# Patient Record
Sex: MALE | Race: WHITE | NOT HISPANIC OR LATINO | Employment: FULL TIME | URBAN - METROPOLITAN AREA
[De-identification: names, ages, dates, MRNs, and addresses within clinical notes are randomized per-mention and may not be internally consistent; named-entity substitution may affect disease eponyms.]

---

## 2023-07-27 ENCOUNTER — HOSPITAL ENCOUNTER (OUTPATIENT)
Dept: DATA CONVERSION | Facility: HOSPITAL | Age: 38
Discharge: HOME | End: 2023-07-27

## 2023-07-27 DIAGNOSIS — Z87.442 PERSONAL HISTORY OF URINARY CALCULI: ICD-10-CM

## 2023-07-27 DIAGNOSIS — Z88.0 ALLERGY STATUS TO PENICILLIN: ICD-10-CM

## 2023-07-27 DIAGNOSIS — R10.9 UNSPECIFIED ABDOMINAL PAIN: Primary | ICD-10-CM

## 2023-07-27 DIAGNOSIS — R11.0 NAUSEA: ICD-10-CM

## 2023-07-27 LAB
ALBUMIN SERPL-MCNC: 4.6 GM/DL (ref 3.5–5)
ALBUMIN/GLOB SERPL: 1.4 RATIO (ref 1.5–3)
ALP BLD-CCNC: 102 U/L (ref 35–125)
ALT SERPL-CCNC: 30 U/L (ref 5–40)
ANION GAP SERPL CALCULATED.3IONS-SCNC: 9 MMOL/L (ref 0–19)
AST SERPL-CCNC: 22 U/L (ref 5–40)
BACTERIA UR QL AUTO: NEGATIVE
BASOPHILS # BLD AUTO: 0.05 K/UL (ref 0–0.22)
BASOPHILS NFR BLD AUTO: 0.5 % (ref 0–1)
BILIRUB SERPL-MCNC: 0.4 MG/DL (ref 0.1–1.2)
BILIRUB UR QL STRIP.AUTO: NEGATIVE
BUN SERPL-MCNC: 14 MG/DL (ref 8–25)
BUN/CREAT SERPL: 14 RATIO (ref 8–21)
CALCIUM SERPL-MCNC: 9.9 MG/DL (ref 8.5–10.4)
CHLORIDE SERPL-SCNC: 102 MMOL/L (ref 97–107)
CLARITY UR: CLEAR
CO2 SERPL-SCNC: 30 MMOL/L (ref 24–31)
COLOR UR: ABNORMAL
CREAT SERPL-MCNC: 1 MG/DL (ref 0.4–1.6)
DEPRECATED RDW RBC AUTO: 42.6 FL (ref 37–54)
DIFFERENTIAL METHOD BLD: ABNORMAL
EOSINOPHIL # BLD AUTO: 0.11 K/UL (ref 0–0.45)
EOSINOPHIL NFR BLD: 1 % (ref 0–3)
ERYTHROCYTE [DISTWIDTH] IN BLOOD BY AUTOMATED COUNT: 12.2 % (ref 11.7–15)
GFR SERPL CREATININE-BSD FRML MDRD: 101 ML/MIN/1.73 M2
GLOBULIN SER-MCNC: 3.3 G/DL (ref 1.9–3.7)
GLUCOSE SERPL-MCNC: 101 MG/DL (ref 65–99)
GLUCOSE UR STRIP.AUTO-MCNC: NEGATIVE MG/DL
HCT VFR BLD AUTO: 50 % (ref 41–50)
HGB BLD-MCNC: 17.3 GM/DL (ref 13.5–16.5)
HGB UR QL STRIP.AUTO: 4 /HPF (ref 0–3)
HGB UR QL: NEGATIVE
HYALINE CASTS UR QL AUTO: ABNORMAL /LPF
IMM GRANULOCYTES # BLD AUTO: 0.04 K/UL (ref 0–0.1)
KETONES UR QL STRIP.AUTO: NEGATIVE
LEUKOCYTE ESTERASE UR QL STRIP.AUTO: NEGATIVE
LYMPHOCYTES # BLD AUTO: 2.46 K/UL (ref 1.2–3.2)
LYMPHOCYTES NFR BLD MANUAL: 23 % (ref 20–40)
MCH RBC QN AUTO: 32.7 PG (ref 26–34)
MCHC RBC AUTO-ENTMCNC: 34.6 % (ref 31–37)
MCV RBC AUTO: 94.5 FL (ref 80–100)
MICROSCOPIC (UA): ABNORMAL
MONOCYTES # BLD AUTO: 0.92 K/UL (ref 0–0.8)
MONOCYTES NFR BLD MANUAL: 8.6 % (ref 0–8)
NEUTROPHILS # BLD AUTO: 7.11 K/UL
NEUTROPHILS # BLD AUTO: 7.11 K/UL (ref 1.8–7.7)
NEUTROPHILS.IMMATURE NFR BLD: 0.4 % (ref 0–1)
NEUTS SEG NFR BLD: 66.5 % (ref 50–70)
NITRITE UR QL STRIP.AUTO: NEGATIVE
NRBC BLD-RTO: 0 /100 WBC
PH UR STRIP.AUTO: 7 [PH] (ref 4.6–8)
PLATELET # BLD AUTO: 426 K/UL (ref 150–450)
PMV BLD AUTO: 9.6 CU (ref 7–12.6)
POTASSIUM SERPL-SCNC: 4.5 MMOL/L (ref 3.4–5.1)
PROT SERPL-MCNC: 7.9 G/DL (ref 5.9–7.9)
PROT UR STRIP.AUTO-MCNC: ABNORMAL MG/DL
RBC # BLD AUTO: 5.29 M/UL (ref 4.5–5.5)
SODIUM SERPL-SCNC: 141 MMOL/L (ref 133–145)
SP GR UR STRIP.AUTO: 1.02 (ref 1–1.03)
SQUAMOUS UR QL AUTO: ABNORMAL /HPF
URINE CULTURE: ABNORMAL
UROBILINOGEN UR QL STRIP.AUTO: NORMAL MG/DL (ref 0–1)
WBC # BLD AUTO: 10.7 K/UL (ref 4.5–11)
WBC #/AREA URNS AUTO: ABNORMAL /HPF (ref 0–3)

## 2023-09-12 ENCOUNTER — HOSPITAL ENCOUNTER (OUTPATIENT)
Dept: DATA CONVERSION | Facility: HOSPITAL | Age: 38
End: 2023-09-12

## 2023-09-29 ENCOUNTER — HOSPITAL ENCOUNTER (OUTPATIENT)
Dept: DATA CONVERSION | Facility: HOSPITAL | Age: 38
Discharge: HOME | End: 2023-09-29
Payer: COMMERCIAL

## 2023-09-29 DIAGNOSIS — M54.12 RADICULOPATHY, CERVICAL REGION: ICD-10-CM

## 2023-10-04 PROBLEM — N23 RENAL COLIC: Status: ACTIVE | Noted: 2023-10-04

## 2023-10-04 PROBLEM — D72.829 LEUKOCYTOSIS: Status: ACTIVE | Noted: 2023-10-04

## 2023-10-04 PROBLEM — E66.9 OBESITY, UNSPECIFIED: Status: ACTIVE | Noted: 2023-10-04

## 2023-10-04 PROBLEM — M51.9 DISORDER OF INTERVERTEBRAL DISC OF LUMBAR SPINE: Status: ACTIVE | Noted: 2023-10-04
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
## 2023-10-04 PROBLEM — S06.0X0A CONCUSSION WITHOUT LOSS OF CONSCIOUSNESS: Status: ACTIVE | Noted: 2023-10-04

## 2023-10-04 PROBLEM — M21.70 LEG LENGTH DISCREPANCY: Status: ACTIVE | Noted: 2023-10-04

## 2023-10-04 PROBLEM — F41.9 ANXIETY: Status: ACTIVE | Noted: 2023-10-04

## 2023-10-04 PROBLEM — D72.9 ABNORMAL WBC COUNT: Status: ACTIVE | Noted: 2023-10-04

## 2023-10-04 PROBLEM — I10 PRIMARY HYPERTENSION: Status: ACTIVE | Noted: 2023-10-04

## 2023-10-04 RX ORDER — METHOCARBAMOL 750 MG/1
TABLET, FILM COATED ORAL
COMMUNITY
End: 2024-03-15 | Stop reason: WASHOUT

## 2023-10-04 RX ORDER — ONDANSETRON 4 MG/1
4 TABLET, ORALLY DISINTEGRATING ORAL EVERY 6 HOURS PRN
COMMUNITY
Start: 2023-07-23 | End: 2024-03-15 | Stop reason: WASHOUT

## 2023-10-04 RX ORDER — ATORVASTATIN CALCIUM 10 MG/1
10 TABLET, FILM COATED ORAL DAILY
COMMUNITY
Start: 2023-07-11 | End: 2024-03-15 | Stop reason: WASHOUT

## 2023-10-04 RX ORDER — OXYCODONE AND ACETAMINOPHEN 5; 325 MG/1; MG/1
TABLET ORAL
COMMUNITY
Start: 2023-07-23 | End: 2024-03-15 | Stop reason: WASHOUT

## 2023-10-04 RX ORDER — PREDNISONE 10 MG/1
TABLET ORAL
COMMUNITY
Start: 2023-09-11 | End: 2024-03-15 | Stop reason: WASHOUT

## 2023-10-04 RX ORDER — LOSARTAN POTASSIUM 25 MG/1
TABLET ORAL
COMMUNITY
End: 2024-03-15 | Stop reason: WASHOUT

## 2023-10-04 RX ORDER — MELOXICAM 15 MG/1
TABLET ORAL
COMMUNITY
End: 2024-03-15 | Stop reason: WASHOUT

## 2023-10-04 RX ORDER — HYDROCODONE BITARTRATE AND ACETAMINOPHEN 5; 325 MG/1; MG/1
1 TABLET ORAL EVERY 4 HOURS PRN
COMMUNITY
Start: 2023-06-19 | End: 2024-03-15 | Stop reason: WASHOUT

## 2023-10-05 ENCOUNTER — APPOINTMENT (OUTPATIENT)
Dept: PHYSICAL THERAPY | Facility: CLINIC | Age: 38
End: 2023-10-05
Payer: COMMERCIAL

## 2023-10-05 ENCOUNTER — TREATMENT (OUTPATIENT)
Dept: PHYSICAL THERAPY | Facility: CLINIC | Age: 38
End: 2023-10-05
Payer: COMMERCIAL

## 2023-10-05 DIAGNOSIS — M54.12 CERVICAL RADICULOPATHY: Primary | ICD-10-CM

## 2023-10-05 DIAGNOSIS — M51.9 DISORDER OF INTERVERTEBRAL DISC OF LUMBAR SPINE: ICD-10-CM

## 2023-10-05 DIAGNOSIS — M51.9 UNSPECIFIED THORACIC, THORACOLUMBAR AND LUMBOSACRAL INTERVERTEBRAL DISC DISORDER: ICD-10-CM

## 2023-10-05 PROCEDURE — 97113 AQUATIC THERAPY/EXERCISES: CPT | Mod: GP | Performed by: PHYSICAL THERAPIST

## 2023-10-05 ASSESSMENT — PAIN SCALES - GENERAL: PAINLEVEL_OUTOF10: 9

## 2023-10-05 ASSESSMENT — PAIN - FUNCTIONAL ASSESSMENT: PAIN_FUNCTIONAL_ASSESSMENT: 0-10

## 2023-10-05 NOTE — PROGRESS NOTES
"Physical Therapy    Physical Therapy Treatment    Patient Name: Carmelo Reese  MRN: [id number]  Today's Date: 10/5/2023         Assessment:   Pt with less L UE pain (8/10), and no pain incr with L C rot after rep C ret exercise.  Pt appears to have an extension preference of C spine.    Plan:   Assess response to HEP of rep C-ret, particularly with respect to L UE radicular s/s.    Current Problem  1. Cervical radiculopathy        2. Unspecified thoracic, thoracolumbar and lumbosacral intervertebral disc disorder  PT eval and treat      3. Disorder of intervertebral disc of lumbar spine            Subjective   General  General Comment: PAIN IS BETTer in back and leg, but was pulling hard on an object recently and now L ue IS VERY BAD.  Precautions  Precautions  Precautions Comment: NONE    Pain  Pain Assessment: 0-10  Pain Score: 9  Pain Location: Arm  Pain Orientation: Left  Pain Radiating Towards: FINGERS    Objective   Min decr R C rot, mod decr L.  Incr L UE pain with L rot    Treatments:   Walk across pool forward/backward/side step x 3 laps each    At HR with B/L UE support:    Hip ABD/EXT/SLR x 10 reps  HS curls x 10 reps  MARCH X 10 EA     March across pool with/without noodle x 2 laps    Deep end for decompression:  Hang x 2'  Bicycle x 2'  Hang x 2'  Hip ABD/ADD x 2'  Hang x 2'  X country x 2'  Hang x 2'    At steps:  HS stretch 30 sec x 2' R/L LE's    At bench:  C ret 2\", 2 x 10    Walk across pool x 2 laps    Activity:  HEP:  Access Code: [email or web address]  URL: [email or web address]  Date: 10/05/2023  Prepared by: Mookie Veliz    Exercises  - Seated Cervical Retraction  - 5 x daily - 7 x weekly - 1-2 sets - 10 reps - 2 seconds hold  - Seated Scapular Retraction  - 1 x daily - 7 x weekly - 1 sets - 10 reps - 2 seconds hold    OP EDUCATION:  Stop C ret if worsens radicular s/s    Goals:     "

## 2023-10-09 ENCOUNTER — TREATMENT (OUTPATIENT)
Dept: PHYSICAL THERAPY | Facility: CLINIC | Age: 38
End: 2023-10-09
Payer: COMMERCIAL

## 2023-10-09 DIAGNOSIS — M54.12 CERVICAL RADICULOPATHY: Primary | ICD-10-CM

## 2023-10-09 DIAGNOSIS — M51.9 UNSPECIFIED THORACIC, THORACOLUMBAR AND LUMBOSACRAL INTERVERTEBRAL DISC DISORDER: ICD-10-CM

## 2023-10-09 PROCEDURE — 97140 MANUAL THERAPY 1/> REGIONS: CPT | Mod: GP

## 2023-10-09 PROCEDURE — 97110 THERAPEUTIC EXERCISES: CPT | Mod: GP

## 2023-10-09 NOTE — PROGRESS NOTES
"Physical Therapy Treatment    Patient Name: Carmelo Reese  MRN: [id number]  Today's Date: 10/9/2023  Time Calculation  Start Time: 1455  Stop Time: 1545  Time Calculation (min): 50 minVisit 3/8    Visit Number:  3 / 8  Visit Authorized:  9    Current Problem  1. Cervical radiculopathy        2. Unspecified thoracic, thoracolumbar and lumbosacral intervertebral disc disorder  PT eval and treat          Surgery    Precautions       Pain  Neck pain with left UE paresthesia       Subjective  General  Patient reports continued neck pain with left >>> rightUE radicular symptoms form neck to fingers.  Patient frustrated with chronicity of symptom.  Patient with difficulty sleeping.  Patient agreaeble to dry needling.  Patient compliant with HEP.  Patient to RTW tomorrow- running equipment.       Objective  + tenderness/spasm left UT    Extremity/Trunk Assessment  Restricted cervical rotation left    Treatment:  Manual  Dry needling to left UT  Patient sidelying  3 x 30 mm needles  Pistoned and released  No adverse reactions  LTR medial UT fiber    Seated STM/IASTM bilateral UT/CPS  Gentle manual traction/SOR/UT stretching    TherEX  Revisited chin tucks in supine- better response      Assessment:   Patient with limited improvement with above POC.  Patient with decreased neck pain.  Patient with modest improvement in right > left UE radicular symptoms.    Pain end of session:   \"Looser-neck is better\"    Plan:  Continue with manual/add cervical traction as tolerated; progress HEP as appropriate.       Goals:               "

## 2023-10-12 ENCOUNTER — APPOINTMENT (OUTPATIENT)
Dept: PHYSICAL THERAPY | Facility: CLINIC | Age: 38
End: 2023-10-12
Payer: COMMERCIAL

## 2023-10-16 ENCOUNTER — TREATMENT (OUTPATIENT)
Dept: PHYSICAL THERAPY | Facility: CLINIC | Age: 38
End: 2023-10-16
Payer: COMMERCIAL

## 2023-10-16 DIAGNOSIS — M54.12 CERVICAL RADICULOPATHY: Primary | ICD-10-CM

## 2023-10-16 DIAGNOSIS — M51.9 UNSPECIFIED THORACIC, THORACOLUMBAR AND LUMBOSACRAL INTERVERTEBRAL DISC DISORDER: ICD-10-CM

## 2023-10-16 PROCEDURE — 97012 MECHANICAL TRACTION THERAPY: CPT | Mod: GP

## 2023-10-16 PROCEDURE — 97140 MANUAL THERAPY 1/> REGIONS: CPT | Mod: GP

## 2023-10-16 NOTE — PROGRESS NOTES
Physical Therapy Treatment    Patient Name: Carmelo Reese  MRN: [id number]  Encounter date:  10/16/2023  Time Calculation  Start Time: 1505  Stop Time: 1555  Time Calculation (min): 50 min    Visit Number:  4 / 8   Visit Authorized:  8    Current Problem  1. Cervical radiculopathy        2. Unspecified thoracic, thoracolumbar and lumbosacral intervertebral disc disorder  PT eval and treat                Pain  Patient reports variable levels of bilateral hand paresthesia.     Subjective  General  Patient reports improved ROM since previous visit.  Patient reports left >> right sided UE paresthesia  Objective  Bilateral cervical rotation to 65 degrees without symptoms    Treatment:  Manual    Dry needling to left UT  Patient sidelying  3 x 30 mm needles  Pistoned and released  No adverse reactions  LTR mid belly UT fiber    Seated STM/IASTM bilateral UT/CPS    TherEx  Supine OTB ER/HABD    Cervical traction  Trial of mechanical cervical traction  Head elevated 15 degrees  16/11# MAX/MIIN  40/20 On/off  Tolerated 9 minutes- increase in bilateral hand paresthesia- treatment terminated      Pain end of session:   Patient's symptoms returned to baseline upon sitting    Plan:     Next visit- progress exercise to standing/manual/retrial traction with lower weight/increase angle of cervical flexion to 20 degrees.    Assessment of current progress against goals:  Insufficient treatment time to assess progress .  Patient with improved cervical symptoms; radicular symptoms highly variable.    Goals:

## 2023-10-20 DIAGNOSIS — M54.12 RADICULOPATHY, CERVICAL REGION: ICD-10-CM

## 2023-10-20 RX ORDER — IBUPROFEN 800 MG/1
TABLET ORAL
Qty: 60 TABLET | Refills: 2 | Status: SHIPPED | OUTPATIENT
Start: 2023-10-20 | End: 2024-03-15 | Stop reason: WASHOUT

## 2023-10-23 ENCOUNTER — TREATMENT (OUTPATIENT)
Dept: PHYSICAL THERAPY | Facility: CLINIC | Age: 38
End: 2023-10-23
Payer: COMMERCIAL

## 2023-10-23 DIAGNOSIS — M51.9 UNSPECIFIED THORACIC, THORACOLUMBAR AND LUMBOSACRAL INTERVERTEBRAL DISC DISORDER: ICD-10-CM

## 2023-10-23 DIAGNOSIS — M54.12 CERVICAL RADICULOPATHY: Primary | ICD-10-CM

## 2023-10-23 PROCEDURE — 97035 APP MDLTY 1+ULTRASOUND EA 15: CPT | Mod: GP

## 2023-10-23 PROCEDURE — 97140 MANUAL THERAPY 1/> REGIONS: CPT | Mod: GP

## 2023-10-23 PROCEDURE — 97110 THERAPEUTIC EXERCISES: CPT | Mod: GP

## 2023-10-23 NOTE — PROGRESS NOTES
"Physical Therapy Treatment    Patient Name: Carmelo Reese  MRN: [id number]  Encounter date:  10/23/2023  Time Calculation  Start Time: 1515  Stop Time: 1600  Time Calculation (min): 45 min    Visit Number:  5 / 8   Visit Authorized:  8    Current Problem  1. Cervical radiculopathy        2. Unspecified thoracic, thoracolumbar and lumbosacral intervertebral disc disorder  PT eval and treat              Pain:  Patient reports right > left LPS/gluteal type pain/some right thigh radicular- (no number obtained)       Subjective  General  Patient reports marked reduction in neck pain/bilateral UE pain.  Patient increasing activity at home and work without pain.  Patient with reports of neck pain/digit # 5 paresthesia bilateral.  Patient does report ml of return of LBP with some radiating into right thigh.  Patient would like to focus on LBP/LE symptoms today.         Objective  ++ TrP bilateral LPS right > left  Decreased guarding of transfers, , etc..    Treatment:  Manual:    Dry needling to bilateral lumbar multifidus  Using 4 x 30 mm needles   Patient prone  No adverse reactions    Prone STM bilateral LPS/glutes- patient prone    Ultrasound:  Patient prone  Bilateral LPS  1.0MHz 1.5 w/cm2 100% x 8 min    TherEX:  Verbal review/handout issued (patient familiar from previous PT episode- declined today secondary to fatigue)  Single KTC  Supine HS stretch  PPT  Bridges  Access code: [email or web address]    Billed Treatment Times:  Manual Therapy  25 min  Ultrasound 8 min  TherEx 5 min      OP EDUCATION:  Initiate HEP/continue with cervical HEP     Assessment:     Pt's response to treatment:  good  Areas of improvements:  soft tissue spasm  Limitations/deficits:  core weakness  Pain end of session:  \"better\"    Plan:     Continue with current POC with the following changes advance as per symptoms.  Patient will need further HEP development for long term management.    Assessment of current progress against goals:  Progressing toward " functional goals- LBP has returned to impair work and ADL tolerance           Goals:

## 2023-10-26 ENCOUNTER — TREATMENT (OUTPATIENT)
Dept: PHYSICAL THERAPY | Facility: CLINIC | Age: 38
End: 2023-10-26
Payer: COMMERCIAL

## 2023-10-26 DIAGNOSIS — M51.9 UNSPECIFIED THORACIC, THORACOLUMBAR AND LUMBOSACRAL INTERVERTEBRAL DISC DISORDER: ICD-10-CM

## 2023-10-26 DIAGNOSIS — M54.12 CERVICAL RADICULOPATHY: Primary | ICD-10-CM

## 2023-10-26 PROCEDURE — 97035 APP MDLTY 1+ULTRASOUND EA 15: CPT | Mod: GP | Performed by: PHYSICAL THERAPIST

## 2023-10-26 PROCEDURE — 97124 MASSAGE THERAPY: CPT | Mod: GP | Performed by: PHYSICAL THERAPIST

## 2023-10-26 PROCEDURE — 97014 ELECTRIC STIMULATION THERAPY: CPT | Mod: GP | Performed by: PHYSICAL THERAPIST

## 2023-10-26 NOTE — PROGRESS NOTES
"Physical Therapy Treatment    Patient Name: Carmelo Reese  MRN: [id number]  Encounter date:  10/26/2023  Time Calculation  Start Time: 1557  Stop Time: 1638  Time Calculation (min): 41 min    Visit Number:  6 / 8   Visit Authorized:  8    Current Problem  1. Cervical radiculopathy        2. Unspecified thoracic, thoracolumbar and lumbosacral intervertebral disc disorder  PT eval and treat          Pain:  7/10 R LB.      Subjective  General  Pt states sleeping better as UE'S are feeling better.  Pt states we are holding traction as it made his neck sore.  Happy his neck and UE's are progressing. Patient is working 2 jobs again.   Less pain R thigh today.  Pt wants us and massage.       Objective  Decr tension LB mm after tx    Treatment:  Manual:    Prone STM R LPS/glutes 15'    Unattended electrical stimulation:  Interferential current  Parameters:  Other.  Lv 10  Patient position:  Seated  Electrodes placed:  LB, with heat  Comment:  skin checks OK    Time:  12'    Ultrasound:  Patient prone  R LPS  1 MHz 1.5 w/cm2 100% x 5 min    DNP THE BELOW:    Dry needling to bilateral lumbar multifidus  Using 4 x 30 mm needles   Patient prone  No adverse reactions    TherEX:  Verbal review/handout issued (patient familiar from previous PT episode- declined today secondary to fatigue)  Single [facility]  Supine HS stretch  PPT  Bridges  Access code: [email or web address]    Billed Treatment Times:  Manual Therapy  15 min  Ultrasound 5 min  TherEx 0 min  Stim 12'      OP EDUCATION:  Initiate HEP/continue with cervical HEP     Assessment:     Pt's response to treatment:  good  Areas of improvements:  soft tissue tension  Limitations/deficits:  core weakness  Pain end of session:  \"I feel good\"    Plan:     Continue with current POC with the following changes advance as per symptoms.  Patient will need further HEP development for long term management.    Assessment of current progress against goals:  Progressing toward functional goals- LBP has " 12/29/2020 social work transition of care planning  Sw followed up with [facility],they will review.   Electronically signed by Harold Phoenix, LSW on 12/29/2020 at 10:34 AM returned to impair work and ADL tolerance           Goals:

## 2023-10-30 ENCOUNTER — TREATMENT (OUTPATIENT)
Dept: PHYSICAL THERAPY | Facility: CLINIC | Age: 38
End: 2023-10-30
Payer: COMMERCIAL

## 2023-10-30 DIAGNOSIS — M51.9 UNSPECIFIED THORACIC, THORACOLUMBAR AND LUMBOSACRAL INTERVERTEBRAL DISC DISORDER: ICD-10-CM

## 2023-10-30 PROCEDURE — 97035 APP MDLTY 1+ULTRASOUND EA 15: CPT | Mod: GP

## 2023-10-30 PROCEDURE — 97140 MANUAL THERAPY 1/> REGIONS: CPT | Mod: GP

## 2023-10-30 NOTE — PROGRESS NOTES
"Physical Therapy Treatment    Patient Name: Carmelo Reese  MRN: [id number]  Encounter date:  10/30/2023  Time Calculation  Start Time: 1535  Stop Time: 1605  Time Calculation (min): 30 min    Visit Number:  7 / 8   Visit Authorized:  8    Current Problem  1. 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  Patient with muscular type soreness; denies radicular symptoms.  Patient diligent with HEP.  Patient requested abbreviated visit secondary to conflicting appointment this PM      Objective  Guarded transitional movements  ++tenderness bilateral LPS    Treatment:  Manual:  Dry needling to bilateral lumbar multifiddus   Patient prone  4 x 30 mm  needles  Needle time 5 minute  STM bilateral LPS    Ultrasound:  Bilateral LPS  Patient prone  100% duty cycle  1.5w/cm2  8  min    Billed Treatment Times:  Manual 20 min  Ultrasound 8 min      Assessment:     Pt's response to treatment:  good  Areas of improvements:  LBP  Limitations/deficits:  LE/core endurance    Pain end of session:  4    Plan:     Continue with current POC with the following changes re assessment next visit    Assessment of current progress against goals:  Progressing toward functional goals        Goals:               "

## 2023-11-02 ENCOUNTER — APPOINTMENT (OUTPATIENT)
Dept: PHYSICAL THERAPY | Facility: CLINIC | Age: 38
End: 2023-11-02
Payer: COMMERCIAL

## 2023-11-08 ENCOUNTER — TREATMENT (OUTPATIENT)
Dept: PHYSICAL THERAPY | Facility: CLINIC | Age: 38
End: 2023-11-08
Payer: COMMERCIAL

## 2023-11-08 DIAGNOSIS — M51.9 UNSPECIFIED THORACIC, THORACOLUMBAR AND LUMBOSACRAL INTERVERTEBRAL DISC DISORDER: ICD-10-CM

## 2023-11-08 PROCEDURE — 97035 APP MDLTY 1+ULTRASOUND EA 15: CPT | Mod: GP

## 2023-11-08 PROCEDURE — 97140 MANUAL THERAPY 1/> REGIONS: CPT | Mod: GP

## 2023-11-08 NOTE — PROGRESS NOTES
Physical Therapy Treatment    Patient Name: Carmelo Reese  MRN: [id number]  Encounter date:  11/8/2023  Time Calculation  Start Time: 1705  Stop Time: 1740  Time Calculation (min): 35 min    Visit Number:  8 / 8   Visit Authorized:  8    Current Problem  1. Unspecified thoracic, thoracolumbar and lumbosacral intervertebral disc disorder  PT eval and treat            Pain  4    Subjective  General  Patient reports overall improved; however, concerns remain with respect to low back.  Patient reports occasional episodes of LE radicular symptoms.  Patient with intermittent spontaneous right UE paresthesia.  Patient diligent with HEP/body mechanics increased work duties; however, considering chronicity of symptoms, patient is hoping he may qualify for MRI.     Objective  Modest guarding of trunk ROM      Treatment:    Manual:  Dry needling to bilateral lumbar multifiddus   Patient prone  4 x 30 mm  needles  Needle time 5 minute  STM bilateral LPS     Ultrasound:  Bilateral LPS  Patient prone  100% duty cycle  1.5w/cm2  8  min          Billed Treatment Times:  20  minutes manual  8 minutes ultrasound    Assessment:  Patient with improving function; yet symptoms remain as noted in subjective.  Pt's response to treatment:  good  Areas of improvements:  function  Limitations/deficits:  radicuclar symptoms    Pain end of session:  3    Plan:     Continue with current POC with the following changes continue up to 4 visits; re assess with primary PT next visit    Assessment of current progress against goals:  Progressing toward functional goals    OP Education:  Reinforce HEP importance    Goals:

## 2023-11-28 NOTE — PROGRESS NOTES
Physical Therapy Treatment    Patient Name: Carmelo Reese  MRN: [id number]  Encounter date:  11/29/2023       Visit Number:  9 / 12   Visit Authorized:  ***    Current Problem  No diagnosis found.    Surgery  ***    Surgery date:  ***    Precautions       Pain       Subjective  General        ***    Objective  ***    Treatment:    Manual:  Dry needling to bilateral lumbar multifiddus   Patient prone  4 x 30 mm  needles  Needle time 5 minute  STM bilateral LPS     Ultrasound:  Bilateral LPS  Patient prone  100% duty cycle  1.5w/cm2  8  min       Aquatic Therapy:        Billed Treatment Times:  {Treatment times:70542}    Activity tolerance:  {Activity:07909}         Assessment:     Pt's response to treatment:  ***  Areas of improvements:  ***  Limitations/deficits:  ***    Pain end of session:  ***    Plan:     {BASPLAN:56846}    Assessment of current progress against goals:  {BASPTNOTEGOALASSESSMENT:39250}    OP Education:       Goals:    SHORT TERM GOALS - 10 visits   1.  Improve Cx strength to 20-30 degrees at deficits  2.  Improve LE strength by ½ mm grade at deficits  3.  Improve bilateral hamstring length to 50% of WNL  4.  Improve trunk strength to Fair+ or better  5.  Pain:   Neck 5-6; back 6-7    SHORT TERM FUNCTIONAL GOALS:  1.  Able to sleep, sit and walk, 50% of the time with minimal neck and radicular arm pain and lumbar and radicular leg pain    LONG TERM GOALS - 10 visits   1.  Improve Cx AROM to WFL  2.  Improve LE strength to 5/5  3.  Improve bilateral hamstring length to 80% of WNL  4.  Improve trunk flexibility to to WNL and painfree  5.  Improve trunk strength to Good  6.  Improve sitting posture with correct alignment of Cx region and shoulders  7.  Pain:   Neck 2-3 back 6 (pt's baseline)  8. Functional Assessment tool:  Oswestery 50%, Neck index 40%    LONG TERM FUNCTIONAL GOALS:  1.  Able to sleep, sit and walk, 90% of the time with minimal neck and radicular arm pain and lumbar and radicular  leg pain

## 2023-11-29 ENCOUNTER — DOCUMENTATION (OUTPATIENT)
Dept: PHYSICAL THERAPY | Facility: CLINIC | Age: 38
End: 2023-11-29
Payer: COMMERCIAL

## 2023-11-29 ENCOUNTER — APPOINTMENT (OUTPATIENT)
Dept: PHYSICAL THERAPY | Facility: CLINIC | Age: 38
End: 2023-11-29
Payer: COMMERCIAL

## 2023-11-30 ENCOUNTER — TREATMENT (OUTPATIENT)
Dept: PHYSICAL THERAPY | Facility: CLINIC | Age: 38
End: 2023-11-30
Payer: COMMERCIAL

## 2023-11-30 DIAGNOSIS — M51.9 UNSPECIFIED THORACIC, THORACOLUMBAR AND LUMBOSACRAL INTERVERTEBRAL DISC DISORDER: ICD-10-CM

## 2023-11-30 PROCEDURE — 97140 MANUAL THERAPY 1/> REGIONS: CPT | Mod: GP | Performed by: PHYSICAL THERAPIST

## 2023-11-30 NOTE — PROGRESS NOTES
"Physical Therapy Treatment    Patient Name: Carmelo Reese  MRN: [id number]  Today's Date: 11/30/2023  Time Calculation  Start Time: 1555  Stop Time: 1630  Time Calculation (min): 35 min  Visit #9/12      Current Problem  1. Unspecified thoracic, thoracolumbar and lumbosacral intervertebral disc disorder  PT eval and treat          Precautions  None known    Subjective  The patient states that he slipped on ice this morning at his apartment and tweaked his back.    Pain   7-8/10    Objective  Increased muscle tension B pir    Treatments:  Manual:25'  Dry needling to bilateral lumbar multifiddus   Patient prone  4 x 30 mm  needles on each side  Needles left in for 5 minutes while the patient was continually assessed and educated.  Belva pistoned before removal.  no adverse reaction to DN noted, standard precautions followed  STM bilateral LPS     Ultrasound:  Bilateral LPS  Patient prone  100% duty cycle  1.5w/cm2  10 min    Assessment:  The patient tolerated the treatment well and reports some relief after    Post pain assessment:  \"Better. Looser\"    Goals Assessment:  Progressing towards goals dysfunction remains  Plan:  Continue with previously established goals and POC.   "

## 2023-12-05 ENCOUNTER — APPOINTMENT (OUTPATIENT)
Dept: PHYSICAL THERAPY | Facility: CLINIC | Age: 38
End: 2023-12-05
Payer: COMMERCIAL

## 2023-12-07 ENCOUNTER — TREATMENT (OUTPATIENT)
Dept: PHYSICAL THERAPY | Facility: CLINIC | Age: 38
End: 2023-12-07
Payer: COMMERCIAL

## 2023-12-07 DIAGNOSIS — M54.12 CERVICAL RADICULOPATHY: Primary | ICD-10-CM

## 2023-12-07 DIAGNOSIS — M51.9 UNSPECIFIED THORACIC, THORACOLUMBAR AND LUMBOSACRAL INTERVERTEBRAL DISC DISORDER: ICD-10-CM

## 2023-12-07 PROCEDURE — 97110 THERAPEUTIC EXERCISES: CPT | Mod: GP | Performed by: PHYSICAL THERAPIST

## 2023-12-07 PROCEDURE — 97530 THERAPEUTIC ACTIVITIES: CPT | Mod: GP | Performed by: PHYSICAL THERAPIST

## 2023-12-07 NOTE — PROGRESS NOTES
"Physical Therapy Treatment and Progress Report    Patient Name: Carmelo Reese  MRN: [id number]  Today's Date: 12/7/2023  Time Calculation  Start Time: 1133  Stop Time: 1203  Time Calculation (min): 30 min  Visit #10/20      Current Problem  1. Cervical radiculopathy        2. Unspecified thoracic, thoracolumbar and lumbosacral intervertebral disc disorder  PT eval and treat          Precautions  None known    Subjective  The patient states he sleeps on a couch and his neck feels much better and his paresthesia is almost entirely gone in UE.  ROM in neck is better 0-1/10, but LBP up to 8/10 at work still.  Pt states gets daily R le radicular pain, about 25%of the day.    Pain   3/10 LB  LB  Objective:    C spine flexion WNL, ext WNL, Rot R 76 deg, L 72 deg  UE MMT:   B sh abd and elbow flexion 5/5, elbow ext R 4+, L 5.  LE MMT:  Hip flexion and knee ext 5/5 B, no pain with any MMT's  PPT: Normal  Trunk AROM:  flexion WNL, ext min decr  Hamstring felxibility:  R -18 deg L -20 deg    Treatments:   There-activity, as per objective section:  objective testing and assessment of pt's progress: 20'    TE's 10'  PPT 5\" x 10, Cervical ROM 5\" x 2 all planes, HS stretch seated 2 x 30\" ea  Assessment:  Pt is progressing toward goals, especially with neck and cervical radiculopathy, but requires continued PT to reach all goals and restore function-mostly with respect to lumbar spine.  Pt progress is challenged due to nature of pt's work, which will continue to put strain on LB.  Pt wishes to put PT on hold and go back to [facility] to see if he can get and MRI of Lumbar spine.  Pt feels he is ready to be done with PT, but will call within 30 days if he wants to resume PT.      Post pain assessment:  No change with today's session    Goals Assessment:  Progressing towards goals dysfunction remains.  SHORT TERM GOALS - 10 visits   1.  Improve Cx strength to 20-30 degrees at deficits-goal met  2.  Improve LE strength by ½ mm grade at " deficits-goal met  3.  Improve bilateral hamstring length to 50% of WNL-goal met  4.  Improve trunk strength to Fair+ or better-goal met  5.  Pain:   Neck 5-6; back 6-7-goal met    SHORT TERM FUNCTIONAL GOALS:  1.  Able to sleep, sit and walk, 50% of the time with minimal neck and radicular arm pain and lumbar and radicular leg pain-goal met when sleep in couch    LONG TERM GOALS - 10 visits   1.  Improve Cx AROM to WFL -goal met  2.  Improve LE strength to 5/5-goal met  3.  Improve bilateral hamstring length to 80% of WNL-goal met  4.  Improve trunk flexibility to to WNL and pain free-goal PA  5.  Improve trunk strength to Good-goal met  6.  Improve sitting posture with correct alignment of Cx region and shoulders-goal met  7.  Pain:   Neck 2-3 back 6 (pt's baseline)-met for neck, PA for back  8. Functional Assessment tool:  Oswestery 50%-goal met, Neck index 40%-goal met    LONG TERM FUNCTIONAL GOALS:  1.  Able to sleep, sit and walk, 90% of the time with minimal neck and radicular arm pain and lumbar and radicular leg pain-goal PA    Plan:  Skilled PT consisting of:  Aquatics, therapeutic exercise, therapeutic activity, NMR, manual, thermal, electric stimulation, US, light therapy, gait training, transfer training, dry needle, mechanical traction, canalith repositioning.  Rehab Potential: fair to good-pt with heavy and repetitive motions at work  Frequency:  2x/wk  Duration:  10 weeks

## 2023-12-08 ENCOUNTER — APPOINTMENT (OUTPATIENT)
Dept: PRIMARY CARE | Facility: CLINIC | Age: 38
End: 2023-12-08
Payer: COMMERCIAL

## 2023-12-11 ENCOUNTER — OFFICE VISIT (OUTPATIENT)
Dept: PRIMARY CARE | Facility: CLINIC | Age: 38
End: 2023-12-11
Payer: COMMERCIAL

## 2023-12-11 VITALS
HEART RATE: 90 BPM | BODY MASS INDEX: 37.49 KG/M2 | DIASTOLIC BLOOD PRESSURE: 78 MMHG | RESPIRATION RATE: 18 BRPM | SYSTOLIC BLOOD PRESSURE: 138 MMHG | WEIGHT: 267.8 LBS | OXYGEN SATURATION: 98 % | HEIGHT: 71 IN | TEMPERATURE: 97.1 F

## 2023-12-11 DIAGNOSIS — M54.16 LUMBAR RADICULITIS: Primary | ICD-10-CM

## 2023-12-11 PROCEDURE — 4004F PT TOBACCO SCREEN RCVD TLK: CPT | Performed by: FAMILY MEDICINE

## 2023-12-11 PROCEDURE — 3078F DIAST BP <80 MM HG: CPT | Performed by: FAMILY MEDICINE

## 2023-12-11 PROCEDURE — 99213 OFFICE O/P EST LOW 20 MIN: CPT | Performed by: FAMILY MEDICINE

## 2023-12-11 PROCEDURE — 3075F SYST BP GE 130 - 139MM HG: CPT | Performed by: FAMILY MEDICINE

## 2023-12-11 ASSESSMENT — LIFESTYLE VARIABLES
HOW MANY STANDARD DRINKS CONTAINING ALCOHOL DO YOU HAVE ON A TYPICAL DAY: 1 OR 2
HOW OFTEN DO YOU HAVE SIX OR MORE DRINKS ON ONE OCCASION: NEVER
HOW OFTEN DO YOU HAVE A DRINK CONTAINING ALCOHOL: MONTHLY OR LESS
AUDIT-C TOTAL SCORE: 1
SKIP TO QUESTIONS 9-10: 1

## 2023-12-11 ASSESSMENT — ENCOUNTER SYMPTOMS: BACK PAIN: 1

## 2023-12-11 ASSESSMENT — PATIENT HEALTH QUESTIONNAIRE - PHQ9
SUM OF ALL RESPONSES TO PHQ9 QUESTIONS 1 AND 2: 0
1. LITTLE INTEREST OR PLEASURE IN DOING THINGS: NOT AT ALL
2. FEELING DOWN, DEPRESSED OR HOPELESS: NOT AT ALL

## 2023-12-11 ASSESSMENT — PAIN SCALES - GENERAL: PAINLEVEL: 3

## 2023-12-11 NOTE — PROGRESS NOTES
"Subjective   Patient ID: Carmelo Reese is a 38 y.o. male who presents for Back Pain.    HPI pt still c/o back pain     Review of Systems    Objective   /78   Pulse 90   Temp 36.2 °C (97.1 °F)   Resp 18   Ht 1.791 m (5' 10.5\")   Wt 121 kg (267 lb 12.8 oz)   SpO2 98%   BMI 37.88 kg/m²     Physical Exam    Assessment/Plan          "

## 2023-12-11 NOTE — PROGRESS NOTES
"Subjective   Patient ID: Carmelo Reese is a 38 y.o. male who presents for Back Pain.    Back Pain         Review of Systems   Musculoskeletal:  Positive for back pain.       Objective   /78   Pulse 90   Temp 36.2 °C (97.1 °F)   Resp 18   Ht 1.791 m (5' 10.5\")   Wt 121 kg (267 lb 12.8 oz)   SpO2 98%   BMI 37.88 kg/m²     Physical Exam  Constitutional:       General: He is not in acute distress.     Appearance: Normal appearance.   Cardiovascular:      Rate and Rhythm: Normal rate and regular rhythm.      Heart sounds: No murmur heard.  Pulmonary:      Breath sounds: Normal breath sounds. No wheezing.   Neurological:      Mental Status: He is alert.       Lumbar:  ttp and psm spasm bl l2-l5, pos straight leg test bl,    Assessment/Plan   Problem List Items Addressed This Visit             ICD-10-CM    Lumbar radiculitis - Primary M54.16    Relevant Orders    MR lumbar spine wo IV contrast        Pt failed prednisone, and p.t.  "

## 2023-12-27 ENCOUNTER — ANCILLARY PROCEDURE (OUTPATIENT)
Dept: RADIOLOGY | Facility: CLINIC | Age: 38
End: 2023-12-27
Payer: COMMERCIAL

## 2023-12-27 DIAGNOSIS — M54.16 LUMBAR RADICULITIS: ICD-10-CM

## 2023-12-27 PROCEDURE — 72148 MRI LUMBAR SPINE W/O DYE: CPT

## 2024-01-02 ENCOUNTER — TELEPHONE (OUTPATIENT)
Dept: PRIMARY CARE | Facility: CLINIC | Age: 39
End: 2024-01-02
Payer: COMMERCIAL

## 2024-01-02 DIAGNOSIS — M51.36 DEGENERATIVE DISC DISEASE, LUMBAR: Primary | ICD-10-CM

## 2024-01-02 PROBLEM — M51.369 DEGENERATIVE DISC DISEASE, LUMBAR: Status: ACTIVE | Noted: 2024-01-02

## 2024-02-23 ENCOUNTER — DOCUMENTATION (OUTPATIENT)
Dept: PHYSICAL THERAPY | Facility: CLINIC | Age: 39
End: 2024-02-23
Payer: COMMERCIAL

## 2024-02-23 NOTE — PROGRESS NOTES
Physical Therapy    Discharge Summary    Name: Carmelo Reese  MRN: [id number]  : 1985  Date: 24    Discharge Summary: PT    Discharge Information: Date of discharge 24    Therapy Summary: pt did [facility] PT    Discharge Status: at last visit pt states his neck feels much better and his paresthesia is almost entirely gone in UE.  ROM in neck is better, pain only 0-1/10, but LBP up to 8/10 at work still.  Pt states gets daily R le radicular pain, about 25% of the day.      Rehab Discharge Reason: Patient requested due to feeling better overall, but still wanted MRI, will continue HEP.

## 2024-03-15 ENCOUNTER — OFFICE VISIT (OUTPATIENT)
Dept: PRIMARY CARE | Facility: CLINIC | Age: 39
End: 2024-03-15
Payer: COMMERCIAL

## 2024-03-15 VITALS
SYSTOLIC BLOOD PRESSURE: 132 MMHG | TEMPERATURE: 98.2 F | BODY MASS INDEX: 38 KG/M2 | OXYGEN SATURATION: 97 % | HEART RATE: 107 BPM | WEIGHT: 280.2 LBS | DIASTOLIC BLOOD PRESSURE: 82 MMHG

## 2024-03-15 DIAGNOSIS — J98.8 RESPIRATORY INFECTION: Primary | ICD-10-CM

## 2024-03-15 PROCEDURE — 99213 OFFICE O/P EST LOW 20 MIN: CPT | Performed by: REGISTERED NURSE

## 2024-03-15 PROCEDURE — 3079F DIAST BP 80-89 MM HG: CPT | Performed by: REGISTERED NURSE

## 2024-03-15 PROCEDURE — 3075F SYST BP GE 130 - 139MM HG: CPT | Performed by: REGISTERED NURSE

## 2024-03-15 RX ORDER — AZITHROMYCIN 250 MG/1
250 TABLET, FILM COATED ORAL DAILY
COMMUNITY

## 2024-03-15 RX ORDER — PREDNISONE 20 MG/1
20 TABLET ORAL DAILY
Qty: 5 TABLET | Refills: 0 | Status: SHIPPED | OUTPATIENT
Start: 2024-03-15 | End: 2024-03-20

## 2024-03-15 RX ORDER — BENZONATATE 200 MG/1
200 CAPSULE ORAL 3 TIMES DAILY PRN
Qty: 42 CAPSULE | Refills: 0 | Status: SHIPPED | OUTPATIENT
Start: 2024-03-15 | End: 2024-04-14

## 2024-03-15 ASSESSMENT — COLUMBIA-SUICIDE SEVERITY RATING SCALE - C-SSRS
2. HAVE YOU ACTUALLY HAD ANY THOUGHTS OF KILLING YOURSELF?: NO
6. HAVE YOU EVER DONE ANYTHING, STARTED TO DO ANYTHING, OR PREPARED TO DO ANYTHING TO END YOUR LIFE?: NO
1. IN THE PAST MONTH, HAVE YOU WISHED YOU WERE DEAD OR WISHED YOU COULD GO TO SLEEP AND NOT WAKE UP?: NO

## 2024-03-15 ASSESSMENT — PAIN SCALES - GENERAL: PAINLEVEL: 8

## 2024-03-15 ASSESSMENT — ENCOUNTER SYMPTOMS
CHEST TIGHTNESS: 1
COUGH: 1
DEPRESSION: 0
SINUS COMPLAINT: 1
LOSS OF SENSATION IN FEET: 0
WHEEZING: 1
OCCASIONAL FEELINGS OF UNSTEADINESS: 0
SHORTNESS OF BREATH: 1

## 2024-03-15 NOTE — PROGRESS NOTES
Subjective   Patient ID: Carmelo Reese is a 38 y.o. male who presents for Sinus Problem (Cough, stuffy nose- yellow mucus, sore throat, mild headache, fatigue x 3 days).    Sinus Problem  Associated symptoms include coughing and shortness of breath.        Review of Systems   Respiratory:  Positive for cough, chest tightness, shortness of breath and wheezing.    All other systems reviewed and are negative.      Objective   /82   Pulse 107   Temp 36.8 °C (98.2 °F)   Wt 127 kg (280 lb 3.2 oz)   SpO2 97%   BMI 38.00 kg/m²     Physical Exam  Vitals reviewed.   Constitutional:       Appearance: Normal appearance.   Pulmonary:      Effort: Pulmonary effort is normal.      Breath sounds: Normal breath sounds.   Neurological:      Mental Status: He is alert.         Assessment/Plan   Problem List Items Addressed This Visit    None  Visit Diagnoses         Codes    Respiratory infection    -  Primary J98.8    Relevant Medications    benzonatate (Tessalon) 200 mg capsule    predniSONE (Deltasone) 20 mg tablet

## 2025-02-21 ENCOUNTER — APPOINTMENT (OUTPATIENT)
Dept: PRIMARY CARE | Facility: CLINIC | Age: 40
End: 2025-02-21
Payer: COMMERCIAL

## 2025-02-27 ENCOUNTER — APPOINTMENT (OUTPATIENT)
Dept: UROLOGY | Facility: CLINIC | Age: 40
End: 2025-02-27
Payer: COMMERCIAL

## 2025-03-17 ENCOUNTER — OFFICE VISIT (OUTPATIENT)
Dept: PRIMARY CARE | Facility: CLINIC | Age: 40
End: 2025-03-17
Payer: COMMERCIAL

## 2025-03-17 VITALS
WEIGHT: 265.2 LBS | TEMPERATURE: 97.7 F | OXYGEN SATURATION: 98 % | HEART RATE: 124 BPM | BODY MASS INDEX: 37.97 KG/M2 | RESPIRATION RATE: 18 BRPM | HEIGHT: 70 IN | SYSTOLIC BLOOD PRESSURE: 152 MMHG | DIASTOLIC BLOOD PRESSURE: 78 MMHG

## 2025-03-17 DIAGNOSIS — M54.12 CERVICAL RADICULOPATHY: ICD-10-CM

## 2025-03-17 DIAGNOSIS — G47.30 SLEEP APNEA, UNSPECIFIED TYPE: Primary | ICD-10-CM

## 2025-03-17 PROCEDURE — 3008F BODY MASS INDEX DOCD: CPT | Performed by: FAMILY MEDICINE

## 2025-03-17 PROCEDURE — 99213 OFFICE O/P EST LOW 20 MIN: CPT | Performed by: FAMILY MEDICINE

## 2025-03-17 PROCEDURE — 4004F PT TOBACCO SCREEN RCVD TLK: CPT | Performed by: FAMILY MEDICINE

## 2025-03-17 PROCEDURE — 3078F DIAST BP <80 MM HG: CPT | Performed by: FAMILY MEDICINE

## 2025-03-17 PROCEDURE — 3077F SYST BP >= 140 MM HG: CPT | Performed by: FAMILY MEDICINE

## 2025-03-17 RX ORDER — CYCLOBENZAPRINE HCL 10 MG
10 TABLET ORAL NIGHTLY PRN
Qty: 30 TABLET | Refills: 0 | Status: SHIPPED | OUTPATIENT
Start: 2025-03-17 | End: 2025-05-16

## 2025-03-17 ASSESSMENT — ENCOUNTER SYMPTOMS
DEPRESSION: 0
LOSS OF SENSATION IN FEET: 0
OCCASIONAL FEELINGS OF UNSTEADINESS: 0

## 2025-03-17 ASSESSMENT — PATIENT HEALTH QUESTIONNAIRE - PHQ9
1. LITTLE INTEREST OR PLEASURE IN DOING THINGS: NOT AT ALL
SUM OF ALL RESPONSES TO PHQ9 QUESTIONS 1 AND 2: 0
2. FEELING DOWN, DEPRESSED OR HOPELESS: NOT AT ALL

## 2025-03-17 ASSESSMENT — PAIN SCALES - GENERAL: PAINLEVEL_OUTOF10: 0-NO PAIN

## 2025-03-17 NOTE — PROGRESS NOTES
"Subjective   Patient ID: Carmelo Reese is a 39 y.o. male who presents for Apnea.    HPI patient snoring, wife noticed he stopped breathing while sleeping     Review of Systems    Objective   /78   Pulse (!) 124   Temp 36.5 °C (97.7 °F) (Temporal)   Resp 18   Ht 1.778 m (5' 10\")   Wt 120 kg (265 lb 3.2 oz)   SpO2 98%   BMI 38.05 kg/m²     Physical Exam  Constitutional:       General: He is not in acute distress.     Appearance: Normal appearance.   Cardiovascular:      Rate and Rhythm: Normal rate and regular rhythm.      Heart sounds: No murmur heard.  Pulmonary:      Breath sounds: Normal breath sounds. No wheezing.   Neurological:      Mental Status: He is alert.         Assessment/Plan   Problem List Items Addressed This Visit             ICD-10-CM    Cervical radiculopathy M54.12    Relevant Medications    cyclobenzaprine (Flexeril) 10 mg tablet     Other Visit Diagnoses         Codes    Sleep apnea, unspecified type    -  Primary G47.30               "

## 2025-08-18 ENCOUNTER — OFFICE VISIT (OUTPATIENT)
Dept: PRIMARY CARE | Facility: CLINIC | Age: 40
End: 2025-08-18
Payer: COMMERCIAL

## 2025-08-18 VITALS
DIASTOLIC BLOOD PRESSURE: 72 MMHG | SYSTOLIC BLOOD PRESSURE: 142 MMHG | OXYGEN SATURATION: 97 % | TEMPERATURE: 97.5 F | WEIGHT: 289.6 LBS | HEIGHT: 70 IN | RESPIRATION RATE: 18 BRPM | BODY MASS INDEX: 41.46 KG/M2 | HEART RATE: 96 BPM

## 2025-08-18 DIAGNOSIS — G47.10 HYPERSOMNOLENCE: Primary | ICD-10-CM

## 2025-08-18 DIAGNOSIS — S23.9XXA THORACIC SPRAIN: ICD-10-CM

## 2025-08-18 PROCEDURE — 99213 OFFICE O/P EST LOW 20 MIN: CPT | Performed by: FAMILY MEDICINE

## 2025-08-18 PROCEDURE — 3008F BODY MASS INDEX DOCD: CPT | Performed by: FAMILY MEDICINE

## 2025-08-18 PROCEDURE — 3078F DIAST BP <80 MM HG: CPT | Performed by: FAMILY MEDICINE

## 2025-08-18 PROCEDURE — 3077F SYST BP >= 140 MM HG: CPT | Performed by: FAMILY MEDICINE

## 2025-08-18 ASSESSMENT — PATIENT HEALTH QUESTIONNAIRE - PHQ9
SUM OF ALL RESPONSES TO PHQ9 QUESTIONS 1 AND 2: 0
2. FEELING DOWN, DEPRESSED OR HOPELESS: NOT AT ALL
1. LITTLE INTEREST OR PLEASURE IN DOING THINGS: NOT AT ALL

## 2025-08-18 ASSESSMENT — PAIN SCALES - GENERAL: PAINLEVEL_OUTOF10: 7
